# Patient Record
Sex: FEMALE | Race: WHITE | NOT HISPANIC OR LATINO | Employment: OTHER | URBAN - METROPOLITAN AREA
[De-identification: names, ages, dates, MRNs, and addresses within clinical notes are randomized per-mention and may not be internally consistent; named-entity substitution may affect disease eponyms.]

---

## 2023-06-12 LAB
ALANINE AMINOTRANSFERASE (SGPT) (U/L) IN SER/PLAS: 7 U/L (ref 7–45)
ALBUMIN (G/DL) IN SER/PLAS: 3.6 G/DL (ref 3.4–5)
ALKALINE PHOSPHATASE (U/L) IN SER/PLAS: 65 U/L (ref 33–136)
ANION GAP IN SER/PLAS: 10 MMOL/L (ref 10–20)
ASPARTATE AMINOTRANSFERASE (SGOT) (U/L) IN SER/PLAS: 17 U/L (ref 9–39)
BASOPHILS (10*3/UL) IN BLOOD BY AUTOMATED COUNT: 0.07 X10E9/L (ref 0–0.1)
BASOPHILS/100 LEUKOCYTES IN BLOOD BY AUTOMATED COUNT: 1 % (ref 0–2)
BILIRUBIN TOTAL (MG/DL) IN SER/PLAS: 0.5 MG/DL (ref 0–1.2)
CALCIUM (MG/DL) IN SER/PLAS: 9.1 MG/DL (ref 8.6–10.3)
CARBON DIOXIDE, TOTAL (MMOL/L) IN SER/PLAS: 31 MMOL/L (ref 21–32)
CHLORIDE (MMOL/L) IN SER/PLAS: 103 MMOL/L (ref 98–107)
CHOLESTEROL (MG/DL) IN SER/PLAS: 177 MG/DL (ref 0–199)
CHOLESTEROL IN HDL (MG/DL) IN SER/PLAS: 53.9 MG/DL
CHOLESTEROL/HDL RATIO: 3.3
CREATININE (MG/DL) IN SER/PLAS: 0.79 MG/DL (ref 0.5–1.05)
EOSINOPHILS (10*3/UL) IN BLOOD BY AUTOMATED COUNT: 0.13 X10E9/L (ref 0–0.4)
EOSINOPHILS/100 LEUKOCYTES IN BLOOD BY AUTOMATED COUNT: 1.8 % (ref 0–6)
ERYTHROCYTE DISTRIBUTION WIDTH (RATIO) BY AUTOMATED COUNT: 12.9 % (ref 11.5–14.5)
ERYTHROCYTE MEAN CORPUSCULAR HEMOGLOBIN CONCENTRATION (G/DL) BY AUTOMATED: 32.9 G/DL (ref 32–36)
ERYTHROCYTE MEAN CORPUSCULAR VOLUME (FL) BY AUTOMATED COUNT: 97 FL (ref 80–100)
ERYTHROCYTES (10*6/UL) IN BLOOD BY AUTOMATED COUNT: 4.32 X10E12/L (ref 4–5.2)
ESTIMATED AVERAGE GLUCOSE FOR HBA1C: 94 MG/DL
GFR FEMALE: 72 ML/MIN/1.73M2
GLUCOSE (MG/DL) IN SER/PLAS: 116 MG/DL (ref 74–99)
HEMATOCRIT (%) IN BLOOD BY AUTOMATED COUNT: 41.7 % (ref 36–46)
HEMOGLOBIN (G/DL) IN BLOOD: 13.7 G/DL (ref 12–16)
HEMOGLOBIN A1C/HEMOGLOBIN TOTAL IN BLOOD: 4.9 %
IMMATURE GRANULOCYTES/100 LEUKOCYTES IN BLOOD BY AUTOMATED COUNT: 0.3 % (ref 0–0.9)
LDL: 106 MG/DL (ref 0–99)
LEUKOCYTES (10*3/UL) IN BLOOD BY AUTOMATED COUNT: 7.3 X10E9/L (ref 4.4–11.3)
LYMPHOCYTES (10*3/UL) IN BLOOD BY AUTOMATED COUNT: 1.47 X10E9/L (ref 0.8–3)
LYMPHOCYTES/100 LEUKOCYTES IN BLOOD BY AUTOMATED COUNT: 20.1 % (ref 13–44)
MONOCYTES (10*3/UL) IN BLOOD BY AUTOMATED COUNT: 0.48 X10E9/L (ref 0.05–0.8)
MONOCYTES/100 LEUKOCYTES IN BLOOD BY AUTOMATED COUNT: 6.5 % (ref 2–10)
NEUTROPHILS (10*3/UL) IN BLOOD BY AUTOMATED COUNT: 5.16 X10E9/L (ref 1.6–5.5)
NEUTROPHILS/100 LEUKOCYTES IN BLOOD BY AUTOMATED COUNT: 70.3 % (ref 40–80)
PLATELETS (10*3/UL) IN BLOOD AUTOMATED COUNT: 298 X10E9/L (ref 150–450)
POTASSIUM (MMOL/L) IN SER/PLAS: 4.1 MMOL/L (ref 3.5–5.3)
PROTEIN TOTAL: 6.8 G/DL (ref 6.4–8.2)
SODIUM (MMOL/L) IN SER/PLAS: 140 MMOL/L (ref 136–145)
THYROTROPIN (MIU/L) IN SER/PLAS BY DETECTION LIMIT <= 0.05 MIU/L: 1.75 MIU/L (ref 0.44–3.98)
TRIGLYCERIDE (MG/DL) IN SER/PLAS: 84 MG/DL (ref 0–149)
UREA NITROGEN (MG/DL) IN SER/PLAS: 16 MG/DL (ref 6–23)
VLDL: 17 MG/DL (ref 0–40)

## 2023-10-21 PROBLEM — G47.00 INSOMNIA: Status: ACTIVE | Noted: 2023-10-21

## 2023-10-21 PROBLEM — R73.9 HYPERGLYCEMIA: Status: ACTIVE | Noted: 2023-10-21

## 2023-10-21 PROBLEM — F41.9 ANXIETY DISORDER: Status: ACTIVE | Noted: 2023-10-21

## 2023-10-21 PROBLEM — I10 HYPERTENSION: Status: ACTIVE | Noted: 2023-10-21

## 2023-10-21 PROBLEM — K57.32 DIVERTICULITIS, COLON: Status: ACTIVE | Noted: 2023-10-21

## 2023-10-21 PROBLEM — R63.4 WEIGHT LOSS: Status: ACTIVE | Noted: 2023-10-21

## 2023-10-21 PROBLEM — E87.6 HYPOKALEMIA: Status: ACTIVE | Noted: 2023-10-21

## 2023-10-21 PROBLEM — I48.21 PERMANENT ATRIAL FIBRILLATION (MULTI): Status: ACTIVE | Noted: 2023-10-21

## 2023-10-21 PROBLEM — D36.9 TUBULAR ADENOMA: Status: ACTIVE | Noted: 2023-10-21

## 2023-10-21 PROBLEM — I10 BENIGN ESSENTIAL HYPERTENSION: Status: ACTIVE | Noted: 2023-10-21

## 2023-10-21 RX ORDER — CARVEDILOL 3.12 MG/1
1 TABLET ORAL
COMMUNITY
Start: 2020-09-30

## 2023-10-21 RX ORDER — ALPRAZOLAM 0.25 MG/1
TABLET ORAL
COMMUNITY
End: 2023-10-24 | Stop reason: SINTOL

## 2023-10-21 RX ORDER — POTASSIUM CHLORIDE 20 MEQ/15ML
SOLUTION ORAL
COMMUNITY
Start: 2017-04-07 | End: 2023-10-31

## 2023-10-21 RX ORDER — TRAZODONE HYDROCHLORIDE 50 MG/1
1 TABLET ORAL NIGHTLY PRN
COMMUNITY
Start: 2021-12-09 | End: 2023-11-16

## 2023-10-21 RX ORDER — PANTOPRAZOLE SODIUM 40 MG/1
1 TABLET, DELAYED RELEASE ORAL DAILY
COMMUNITY
Start: 2022-05-09 | End: 2024-05-23 | Stop reason: SDUPTHER

## 2023-10-21 RX ORDER — ALPRAZOLAM 0.5 MG/1
0.25 TABLET ORAL DAILY PRN
COMMUNITY
End: 2023-10-24 | Stop reason: SINTOL

## 2023-10-21 RX ORDER — DILTIAZEM HYDROCHLORIDE 120 MG/1
1 TABLET, FILM COATED ORAL DAILY
COMMUNITY
Start: 2017-11-30 | End: 2024-02-27 | Stop reason: SDUPTHER

## 2023-10-21 RX ORDER — ALBUTEROL SULFATE 90 UG/1
2 AEROSOL, METERED RESPIRATORY (INHALATION) EVERY 4 HOURS PRN
COMMUNITY
Start: 2019-07-13 | End: 2024-01-12 | Stop reason: SDUPTHER

## 2023-10-24 PROBLEM — N39.41 URINARY INCONTINENCE, URGE: Status: ACTIVE | Noted: 2023-10-24

## 2023-10-24 PROBLEM — R39.15 URINARY URGENCY: Status: ACTIVE | Noted: 2023-10-24

## 2023-10-30 DIAGNOSIS — E87.6 HYPOKALEMIA: Primary | ICD-10-CM

## 2023-10-31 RX ORDER — POTASSIUM CHLORIDE 20 MEQ/15ML
SOLUTION ORAL
Qty: 946 ML | Refills: 0 | Status: SHIPPED | OUTPATIENT
Start: 2023-10-31 | End: 2024-05-17

## 2023-11-14 DIAGNOSIS — F51.01 PRIMARY INSOMNIA: Primary | ICD-10-CM

## 2023-11-16 ENCOUNTER — OFFICE VISIT (OUTPATIENT)
Dept: CARDIOLOGY | Facility: CLINIC | Age: 88
End: 2023-11-16
Payer: MEDICARE

## 2023-11-16 VITALS
OXYGEN SATURATION: 98 % | HEIGHT: 66 IN | BODY MASS INDEX: 25.71 KG/M2 | DIASTOLIC BLOOD PRESSURE: 80 MMHG | SYSTOLIC BLOOD PRESSURE: 110 MMHG | WEIGHT: 160 LBS | HEART RATE: 76 BPM

## 2023-11-16 DIAGNOSIS — I10 BENIGN ESSENTIAL HYPERTENSION: ICD-10-CM

## 2023-11-16 DIAGNOSIS — I48.21 PERMANENT ATRIAL FIBRILLATION (MULTI): Primary | ICD-10-CM

## 2023-11-16 PROCEDURE — 1159F MED LIST DOCD IN RCRD: CPT | Performed by: INTERNAL MEDICINE

## 2023-11-16 PROCEDURE — 3079F DIAST BP 80-89 MM HG: CPT | Performed by: INTERNAL MEDICINE

## 2023-11-16 PROCEDURE — 99214 OFFICE O/P EST MOD 30 MIN: CPT | Performed by: INTERNAL MEDICINE

## 2023-11-16 PROCEDURE — 1036F TOBACCO NON-USER: CPT | Performed by: INTERNAL MEDICINE

## 2023-11-16 PROCEDURE — 1160F RVW MEDS BY RX/DR IN RCRD: CPT | Performed by: INTERNAL MEDICINE

## 2023-11-16 PROCEDURE — 3074F SYST BP LT 130 MM HG: CPT | Performed by: INTERNAL MEDICINE

## 2023-11-16 RX ORDER — TRAZODONE HYDROCHLORIDE 50 MG/1
TABLET ORAL
Qty: 135 TABLET | Refills: 1 | Status: SHIPPED | OUTPATIENT
Start: 2023-11-16 | End: 2024-04-09 | Stop reason: WASHOUT

## 2024-01-12 DIAGNOSIS — R05.3 CHRONIC COUGH: Primary | ICD-10-CM

## 2024-01-12 RX ORDER — ALBUTEROL SULFATE 90 UG/1
2 AEROSOL, METERED RESPIRATORY (INHALATION) EVERY 4 HOURS PRN
Qty: 18 G | Refills: 1 | Status: SHIPPED | OUTPATIENT
Start: 2024-01-12 | End: 2024-03-18 | Stop reason: SDUPTHER

## 2024-01-12 NOTE — TELEPHONE ENCOUNTER
Next appointment changed to [facility]. Date and arrival time not changed. Called patient but no answer and no VM

## 2024-01-12 NOTE — TELEPHONE ENCOUNTER
PT requesting rx refill for albuterol inhaler to [facility]. Pt has pending appt 4/9/24 and LOV 8/29/23. Can this be done?

## 2024-01-22 DIAGNOSIS — I48.21 PERMANENT ATRIAL FIBRILLATION (MULTI): Primary | ICD-10-CM

## 2024-01-22 RX ORDER — RIVAROXABAN 15 MG/1
15 TABLET, FILM COATED ORAL DAILY
Qty: 30 TABLET | Refills: 2 | Status: SHIPPED | OUTPATIENT
Start: 2024-01-22 | End: 2024-04-23 | Stop reason: SDUPTHER

## 2024-02-27 DIAGNOSIS — I48.21 PERMANENT ATRIAL FIBRILLATION (MULTI): Primary | ICD-10-CM

## 2024-02-27 NOTE — TELEPHONE ENCOUNTER
Rx Refill Request Telephone Encounter    Name:  Henny Medina  :  [id number]  Medication Name:  dilTIAZem (Cardizem) 120 mg immediate release tablet         Specific Pharmacy location:    [id number] [address] Phone: [phone]   Fax: [phone]        Date of last appointment:  2023  Date of next appointment:  2024

## 2024-02-29 RX ORDER — DILTIAZEM HYDROCHLORIDE 120 MG/1
120 TABLET, FILM COATED ORAL DAILY
Qty: 90 TABLET | Refills: 0 | Status: SHIPPED | OUTPATIENT
Start: 2024-02-29 | End: 2024-03-01 | Stop reason: SDUPTHER

## 2024-03-01 DIAGNOSIS — I48.21 PERMANENT ATRIAL FIBRILLATION (MULTI): ICD-10-CM

## 2024-03-01 RX ORDER — DILTIAZEM HYDROCHLORIDE 120 MG/1
120 TABLET, FILM COATED ORAL DAILY
Qty: 90 TABLET | Refills: 0 | Status: SHIPPED | OUTPATIENT
Start: 2024-03-01 | End: 2024-05-24 | Stop reason: SDUPTHER

## 2024-03-18 ENCOUNTER — PATIENT MESSAGE (OUTPATIENT)
Dept: PRIMARY CARE | Facility: CLINIC | Age: 89
End: 2024-03-18
Payer: MEDICARE

## 2024-03-18 DIAGNOSIS — R05.3 CHRONIC COUGH: ICD-10-CM

## 2024-03-18 RX ORDER — ALBUTEROL SULFATE 90 UG/1
2 AEROSOL, METERED RESPIRATORY (INHALATION) EVERY 4 HOURS PRN
Qty: 18 G | Refills: 1 | Status: SHIPPED | OUTPATIENT
Start: 2024-03-18 | End: 2024-04-09 | Stop reason: SDUPTHER

## 2024-04-04 PROBLEM — E78.2 MIXED HYPERLIPIDEMIA: Status: ACTIVE | Noted: 2024-04-04

## 2024-04-04 PROBLEM — R53.83 FATIGUE: Status: ACTIVE | Noted: 2024-04-04

## 2024-04-04 PROBLEM — Z86.79 HISTORY OF HYPERTENSION: Status: ACTIVE | Noted: 2024-04-04

## 2024-04-04 PROBLEM — Z86.39 HISTORY OF ELEVATED LIPIDS: Status: ACTIVE | Noted: 2024-04-04

## 2024-04-04 PROBLEM — E78.5 HYPERLIPIDEMIA: Status: ACTIVE | Noted: 2024-04-04

## 2024-04-04 PROBLEM — Z13.89 SCREENING FOR MULTIPLE CONDITIONS: Status: ACTIVE | Noted: 2024-04-04

## 2024-04-04 PROBLEM — K21.9 GASTROESOPHAGEAL REFLUX DISEASE: Status: ACTIVE | Noted: 2024-04-04

## 2024-04-04 PROBLEM — E78.5 HYPERLIPIDEMIA: Status: RESOLVED | Noted: 2024-04-04 | Resolved: 2024-04-04

## 2024-04-04 PROBLEM — Z86.79 HISTORY OF HYPERTENSION: Status: RESOLVED | Noted: 2024-04-04 | Resolved: 2024-04-04

## 2024-04-04 PROBLEM — I25.10 CORONARY ATHEROSCLEROSIS: Status: ACTIVE | Noted: 2018-07-18

## 2024-04-04 PROBLEM — Z86.39 HISTORY OF ELEVATED LIPIDS: Status: RESOLVED | Noted: 2024-04-04 | Resolved: 2024-04-04

## 2024-04-04 PROBLEM — K63.1 PERFORATION OF INTESTINE (MULTI): Status: RESOLVED | Noted: 2024-04-04 | Resolved: 2024-04-04

## 2024-04-04 PROBLEM — Z00.00 HEALTHCARE MAINTENANCE: Status: ACTIVE | Noted: 2024-04-04

## 2024-04-09 ENCOUNTER — OFFICE VISIT (OUTPATIENT)
Dept: PRIMARY CARE | Facility: CLINIC | Age: 89
End: 2024-04-09
Payer: MEDICARE

## 2024-04-09 VITALS — HEART RATE: 79 BPM | TEMPERATURE: 97.2 F | DIASTOLIC BLOOD PRESSURE: 85 MMHG | SYSTOLIC BLOOD PRESSURE: 136 MMHG

## 2024-04-09 DIAGNOSIS — Z00.00 ROUTINE GENERAL MEDICAL EXAMINATION AT HEALTH CARE FACILITY: ICD-10-CM

## 2024-04-09 DIAGNOSIS — R05.3 CHRONIC COUGH: ICD-10-CM

## 2024-04-09 DIAGNOSIS — G47.00 INSOMNIA, UNSPECIFIED TYPE: ICD-10-CM

## 2024-04-09 DIAGNOSIS — I10 BENIGN ESSENTIAL HYPERTENSION: ICD-10-CM

## 2024-04-09 DIAGNOSIS — I48.21 PERMANENT ATRIAL FIBRILLATION (MULTI): ICD-10-CM

## 2024-04-09 DIAGNOSIS — Z00.00 HEALTHCARE MAINTENANCE: Primary | ICD-10-CM

## 2024-04-09 DIAGNOSIS — T14.8XXA ABRASION: ICD-10-CM

## 2024-04-09 DIAGNOSIS — Z13.89 SCREENING FOR MULTIPLE CONDITIONS: ICD-10-CM

## 2024-04-09 PROCEDURE — 3079F DIAST BP 80-89 MM HG: CPT | Performed by: FAMILY MEDICINE

## 2024-04-09 PROCEDURE — G0439 PPPS, SUBSEQ VISIT: HCPCS | Performed by: FAMILY MEDICINE

## 2024-04-09 PROCEDURE — 90677 PCV20 VACCINE IM: CPT | Performed by: FAMILY MEDICINE

## 2024-04-09 PROCEDURE — 1123F ACP DISCUSS/DSCN MKR DOCD: CPT | Performed by: FAMILY MEDICINE

## 2024-04-09 PROCEDURE — 99214 OFFICE O/P EST MOD 30 MIN: CPT | Performed by: FAMILY MEDICINE

## 2024-04-09 PROCEDURE — 1160F RVW MEDS BY RX/DR IN RCRD: CPT | Performed by: FAMILY MEDICINE

## 2024-04-09 PROCEDURE — 3075F SYST BP GE 130 - 139MM HG: CPT | Performed by: FAMILY MEDICINE

## 2024-04-09 PROCEDURE — 1170F FXNL STATUS ASSESSED: CPT | Performed by: FAMILY MEDICINE

## 2024-04-09 PROCEDURE — 1159F MED LIST DOCD IN RCRD: CPT | Performed by: FAMILY MEDICINE

## 2024-04-09 PROCEDURE — 1036F TOBACCO NON-USER: CPT | Performed by: FAMILY MEDICINE

## 2024-04-09 PROCEDURE — G0009 ADMIN PNEUMOCOCCAL VACCINE: HCPCS | Performed by: FAMILY MEDICINE

## 2024-04-09 RX ORDER — ALBUTEROL SULFATE 90 UG/1
2 AEROSOL, METERED RESPIRATORY (INHALATION) EVERY 4 HOURS PRN
Qty: 18 G | Refills: 3 | Status: SHIPPED | OUTPATIENT
Start: 2024-04-09 | End: 2024-10-06

## 2024-04-09 RX ORDER — TRAZODONE HYDROCHLORIDE 100 MG/1
100 TABLET ORAL NIGHTLY PRN
Qty: 90 TABLET | Refills: 1 | Status: SHIPPED | OUTPATIENT
Start: 2024-04-09 | End: 2025-04-09

## 2024-04-09 RX ORDER — FLUTICASONE PROPIONATE AND SALMETEROL XINAFOATE 45; 21 UG/1; UG/1
2 AEROSOL, METERED RESPIRATORY (INHALATION)
Qty: 36 G | Refills: 1 | Status: SHIPPED | OUTPATIENT
Start: 2024-04-09 | End: 2025-04-09

## 2024-04-09 ASSESSMENT — PATIENT HEALTH QUESTIONNAIRE - PHQ9
SUM OF ALL RESPONSES TO PHQ9 QUESTIONS 1 AND 2: 0
2. FEELING DOWN, DEPRESSED OR HOPELESS: NOT AT ALL
1. LITTLE INTEREST OR PLEASURE IN DOING THINGS: NOT AT ALL

## 2024-04-09 ASSESSMENT — ACTIVITIES OF DAILY LIVING (ADL)
MANAGING_FINANCES: NEEDS ASSISTANCE
GROCERY_SHOPPING: NEEDS ASSISTANCE
DRESSING: INDEPENDENT
DOING_HOUSEWORK: NEEDS ASSISTANCE
BATHING: INDEPENDENT
TAKING_MEDICATION: NEEDS ASSISTANCE

## 2024-04-09 NOTE — ASSESSMENT & PLAN NOTE
Continue with healthy diet and activity as tolerated.  Screening blood work ordered.  Prevnar 20 given today.  Recommend shingles vaccine.

## 2024-04-09 NOTE — ASSESSMENT & PLAN NOTE
Recommend using Vaseline and bandage.  Call for any increased redness pain or drainage or any other concerns.

## 2024-04-09 NOTE — PROGRESS NOTES
Subjective   Reason for Visit: Henny Medina is an 88 y.o. female here for a Medicare Wellness visit.  Patient presents today for annual wellness visit.  Her  is also present.  She reports that overall she has been doing okay.  She states that she does not feel like the albuterol works as well as it used to.  They are wondering if there is a stronger dose.  She also has been taking 2 trazodone for sleep and reports that this has been helping.  She would like to continue with this.  She did also have a fall.  She states that when she got up she slid and had an abrasion on her right elbow.  She did not hit her head.  She states that otherwise she is feeling well.  She denies any chest pain or shortness of breath or abdominal pain.  Reports mood is good.  Denies any other concerns.    Past Medical, Surgical, and Family History reviewed and updated in chart.    Reviewed all medications by prescribing practitioner or clinical pharmacist (such as prescriptions, OTCs, herbal therapies and supplements) and documented in the medical record.    HPI    Patient Care Team:  Vanessa Keys MD as PCP - General  Vanessa Keys MD as PCP - [employer] PCP     Review of Systems    Objective   Vitals:  /85 (BP Location: Left arm, Patient Position: Sitting)   Pulse 79   Temp 36.2 °C (97.2 °F)       Physical Exam  Vitals reviewed.   HENT:      Head: Normocephalic and atraumatic.      Right Ear: Tympanic membrane normal.      Left Ear: Tympanic membrane normal.      Nose: Nose normal.   Eyes:      Extraocular Movements: Extraocular movements intact.      Conjunctiva/sclera: Conjunctivae normal.      Pupils: Pupils are equal, round, and reactive to light.   Cardiovascular:      Rate and Rhythm: Normal rate. Rhythm irregularly irregular.      Pulses: Normal pulses.   Pulmonary:      Effort: Pulmonary effort is normal.      Breath sounds: Normal breath sounds.   Abdominal:      General: There is no distension.       Palpations: Abdomen is soft.      Tenderness: There is no abdominal tenderness.   Musculoskeletal:         General: Normal range of motion.      Cervical back: Normal range of motion and neck supple.      Right lower leg: No edema.      Left lower leg: No edema.   Lymphadenopathy:      Cervical: No cervical adenopathy.   Skin:     Comments: Patient with abrasion on the right lateral elbow.   Neurological:      General: No focal deficit present.      Mental Status: She is alert.         Assessment/Plan   Problem List Items Addressed This Visit       Benign essential hypertension    Current Assessment & Plan     Blood pressure controlled.  Continue current medications.         Relevant Orders    Lipid Panel    Comprehensive Metabolic Panel    CBC and Auto Differential    Albumin , Urine Random    Insomnia    Current Assessment & Plan     Patient reports she is sleeping well with the trazodone.  This was refilled.         Relevant Medications    traZODone (Desyrel) 100 mg tablet    Permanent atrial fibrillation (CMS/HCC)    Current Assessment & Plan     Persistent A-fib.  Rate controlled.  Continue with current medications including Xarelto.  Continue follow-up with cardiology.         Relevant Orders    Tsh With Reflex To Free T4 If Abnormal    Healthcare maintenance - Primary    Current Assessment & Plan     Continue with healthy diet and activity as tolerated.  Screening blood work ordered.  Prevnar 20 given today.  Recommend shingles vaccine.         Screening for multiple conditions    Current Assessment & Plan     Depression screening negative.         Chronic cough    Current Assessment & Plan     May have a component of COPD as she is a former smoker.  Complaining of albuterol not working as well for her breathing.  Will add Advair and continue with albuterol.  Follow-up if symptoms persist.         Relevant Medications    fluticasone propion-salmeteroL (Advair HFA) 45-21 mcg/actuation inhaler    albuterol  90 mcg/actuation inhaler    Abrasion    Current Assessment & Plan     Recommend using Vaseline and bandage.  Call for any increased redness pain or drainage or any other concerns.          Other Visit Diagnoses       Routine general medical examination at health care facility

## 2024-04-09 NOTE — ASSESSMENT & PLAN NOTE
Persistent A-fib.  Rate controlled.  Continue with current medications including Xarelto.  Continue follow-up with cardiology.

## 2024-04-09 NOTE — ASSESSMENT & PLAN NOTE
May have a component of COPD as she is a former smoker.  Complaining of albuterol not working as well for her breathing.  Will add Advair and continue with albuterol.  Follow-up if symptoms persist.

## 2024-04-23 DIAGNOSIS — I48.21 PERMANENT ATRIAL FIBRILLATION (MULTI): ICD-10-CM

## 2024-05-16 DIAGNOSIS — E87.6 HYPOKALEMIA: ICD-10-CM

## 2024-05-17 RX ORDER — POTASSIUM CHLORIDE 20 MEQ/15ML
SOLUTION ORAL
Qty: 946 ML | Refills: 0 | Status: SHIPPED | OUTPATIENT
Start: 2024-05-17

## 2024-05-23 DIAGNOSIS — K21.9 GASTROESOPHAGEAL REFLUX DISEASE, UNSPECIFIED WHETHER ESOPHAGITIS PRESENT: Primary | ICD-10-CM

## 2024-05-23 RX ORDER — PANTOPRAZOLE SODIUM 40 MG/1
40 TABLET, DELAYED RELEASE ORAL DAILY
Qty: 90 TABLET | Refills: 1 | Status: SHIPPED | OUTPATIENT
Start: 2024-05-23

## 2024-05-23 NOTE — TELEPHONE ENCOUNTER
Rx Refill Request Telephone Encounter    Name:  Henny FIELD Medina  :  [id number]  Medication Name:      pantoprazole (ProtoNix) 40 mg EC tablet       Specific Pharmacy location:    [id number] [address] Phone: [phone]   Fax: [phone]        Date of last appointment:  24    Date of next appointment:  10/22/24

## 2024-05-24 ENCOUNTER — TELEPHONE (OUTPATIENT)
Dept: CARDIOLOGY | Facility: CLINIC | Age: 89
End: 2024-05-24
Payer: MEDICARE

## 2024-05-24 DIAGNOSIS — I48.21 PERMANENT ATRIAL FIBRILLATION (MULTI): ICD-10-CM

## 2024-05-24 NOTE — TELEPHONE ENCOUNTER
Refill dilriazem 120 mg one pill daily #90 x3  to   [facility].      4 pills left **

## 2024-05-28 RX ORDER — DILTIAZEM HYDROCHLORIDE 120 MG/1
120 TABLET, FILM COATED ORAL DAILY
Qty: 90 TABLET | Refills: 3 | Status: SHIPPED | OUTPATIENT
Start: 2024-05-28 | End: 2025-05-28

## 2024-08-13 DIAGNOSIS — Z92.29 HX OF LONG TERM USE OF BLOOD THINNERS: ICD-10-CM

## 2024-08-13 DIAGNOSIS — I10 BENIGN ESSENTIAL HYPERTENSION: Primary | ICD-10-CM

## 2024-08-13 RX ORDER — CARVEDILOL 3.12 MG/1
3.12 TABLET ORAL
Qty: 180 TABLET | Refills: 3 | Status: SHIPPED | OUTPATIENT
Start: 2024-08-13 | End: 2025-08-13

## 2024-08-13 NOTE — TELEPHONE ENCOUNTER
Mr. Medina requesting refill for Henny for carvedilol 3.25 mg one twice daily at [id number] x3       Thanks much !

## 2024-09-16 ENCOUNTER — TELEPHONE (OUTPATIENT)
Dept: CARDIOLOGY | Facility: CLINIC | Age: 89
End: 2024-09-16
Payer: MEDICARE

## 2024-09-16 DIAGNOSIS — I48.21 PERMANENT ATRIAL FIBRILLATION (MULTI): ICD-10-CM

## 2024-09-16 NOTE — TELEPHONE ENCOUNTER
New Rx sent to Dr. Ellington to be filled at [facility] pharmacy. Asked pharmacy to attempt to find cost assistance for patient. Pt's  aware.

## 2024-09-24 DIAGNOSIS — I48.21 PERMANENT ATRIAL FIBRILLATION (MULTI): ICD-10-CM

## 2024-09-26 DIAGNOSIS — I48.21 PERMANENT ATRIAL FIBRILLATION (MULTI): ICD-10-CM

## 2024-10-01 ENCOUNTER — APPOINTMENT (OUTPATIENT)
Dept: CARDIOLOGY | Facility: CLINIC | Age: 89
End: 2024-10-01
Payer: MEDICARE

## 2024-10-01 VITALS
BODY MASS INDEX: 25.82 KG/M2 | HEIGHT: 66 IN | DIASTOLIC BLOOD PRESSURE: 60 MMHG | SYSTOLIC BLOOD PRESSURE: 135 MMHG | HEART RATE: 69 BPM

## 2024-10-01 DIAGNOSIS — I10 BENIGN ESSENTIAL HYPERTENSION: ICD-10-CM

## 2024-10-01 DIAGNOSIS — E78.2 MIXED HYPERLIPIDEMIA: ICD-10-CM

## 2024-10-01 DIAGNOSIS — I48.21 PERMANENT ATRIAL FIBRILLATION (MULTI): Primary | ICD-10-CM

## 2024-10-01 ASSESSMENT — PAIN SCALES - GENERAL: PAINLEVEL: 0-NO PAIN

## 2024-10-01 NOTE — PROGRESS NOTES
Chief Complaint:   No chief complaint on file.     History Of Present Illness:    Henny Medina is a 89 y.o. female with a history of permanent atrial fibrillation on Xarelto, hypertension, and colon mass s/p resection being evaluated for routine follow-up.     Still getting around with her wheelchair.  Denies any exertional chest pain or shortness of breath.  Blood pressure has been well-controlled.  Her  helps her with her blood pressure.     Echocardiogram 10/23/17 demonstrating normal LV size with lower normal function, EF 50-55%. Normal RV size and function. Mild left atrial enlargement. Moderate MR and TR.      Past Medical History:  She has a past medical history of Personal history of other diseases of the circulatory system, Personal history of other diseases of the circulatory system (12/14/2021), and Personal history of other endocrine, nutritional and metabolic disease.    Past Surgical History:  She has a past surgical history that includes Appendectomy (11/01/2017); Exploratory laparotomy (11/01/2017); Cataract extraction (11/01/2017); Colon surgery (11/01/2017); Appendectomy (11/01/2017); Colonoscopy (05/06/2022); and Other surgical history (12/14/2021).      Social History:  She reports that she has never smoked. She has never used smokeless tobacco. She reports that she does not drink alcohol and does not use drugs.    Family History:  Family History   Problem Relation Name Age of Onset    Heart attack Father          acute        Allergies:  Patient has no known allergies.    Outpatient Medications:  Current Outpatient Medications   Medication Instructions    albuterol 90 mcg/actuation inhaler 2 puffs, inhalation, Every 4 hours PRN    carvedilol (COREG) 3.125 mg, oral, 2 times daily (morning and late afternoon)    dilTIAZem (CARDIZEM) 120 mg, oral, Daily    fluticasone propion-salmeteroL (Advair HFA) 45-21 mcg/actuation inhaler 2 puffs, inhalation, 2 times daily RT, Rinse mouth with water  "after use to reduce aftertaste and incidence of candidiasis. Do not swallow.    pantoprazole (PROTONIX) 40 mg, oral, Daily    potassium chloride 20 mEq/15 mL liquid TAKE 7.5 ML BY MOUTH ONCE DAILY    rivaroxaban (XARELTO) 15 mg, oral, Daily    traZODone (DESYREL) 100 mg, oral, Nightly PRN       Last Recorded Vitals:  Visit Vitals  /60 (BP Location: Left arm, Patient Position: Sitting)   Pulse 69   Ht 1.676 m (5' 6\")   BMI 25.82 kg/m²   Smoking Status Never   BSA 1.84 m²      LASTWT(3):   Wt Readings from Last 3 Encounters:   11/16/23 72.6 kg (160 lb)       Physical Exam:  Virtual visit.       Last Labs:  CBC -  Recent Labs     06/12/23  1019 12/01/21  0654 11/30/21  0859   WBC 7.3 10.9 15.6*   HGB 13.7 11.7* 13.2   HCT 41.7 37.0 40.5    296 364   MCV 97 92 90       CMP -  Recent Labs     06/12/23  1019 12/01/21  0654 11/30/21  0859 11/02/21  2150 10/31/21  0842 12/03/20  0603 12/02/20  0611 12/01/20  0413    141 137   < > 140   < > 140 144   K 4.1 3.1* 3.5   < > 3.4*   < > 3.2* 3.7    109* 104   < > 105   < > 107 110*   CO2 31 27 25   < > 28   < > 25 23   ANIONGAP 10 8* 12   < > 10   < > 11 15   BUN 16 16 24*   < > 12   < > 14 18   CREATININE 0.79 0.59 0.73   < > 0.63   < > 0.55 0.52   MG  --   --   --   --  1.90  --  1.90 1.90    < > = values in this interval not displayed.     Recent Labs     06/12/23  1019 11/29/21  2211 11/02/21  2150   ALBUMIN 3.6 3.7 3.6   ALKPHOS 65 136 71   ALT 7 7 5*   AST 17 14 24   BILITOT 0.5 0.8 0.4       LIPID PANEL -   Recent Labs     06/12/23  1019 06/02/21  0856 08/07/19  0929   CHOL 177 186 200*   LDLF 106* 103* 122*   HDL 53.9 58.0 53.0   TRIG 84 125 127       Recent Labs     06/12/23  1019 06/02/21  0856 08/07/19  0929 07/10/19  1800   BNP  --   --   --  263*   HGBA1C 4.9 5.3 4.9  --            Assessment/Plan   1) permanent atrial fibrillation: Rates continue to be controlled by readings from her blood pressure machine.  No complaints of palpitations.  " Continue with long-term oral anticoagulation with Xarelto 15 mg daily.     2) hypertension: Blood pressure well controlled. No changes needed.     3) Elevated LDL: Given most recent guideline recommendations there is no role for statin therapy.     4) follow-up: 1 year or sooner if needed      Sam Ellington MD

## 2024-10-07 ENCOUNTER — PATIENT MESSAGE (OUTPATIENT)
Dept: PRIMARY CARE | Facility: CLINIC | Age: 89
End: 2024-10-07
Payer: MEDICARE

## 2024-10-07 DIAGNOSIS — G47.00 INSOMNIA, UNSPECIFIED TYPE: ICD-10-CM

## 2024-10-07 RX ORDER — TRAZODONE HYDROCHLORIDE 100 MG/1
100 TABLET ORAL NIGHTLY PRN
Qty: 90 TABLET | Refills: 1 | Status: SHIPPED | OUTPATIENT
Start: 2024-10-07 | End: 2025-10-07

## 2024-10-14 PROBLEM — Z13.89 SCREENING FOR MULTIPLE CONDITIONS: Status: RESOLVED | Noted: 2024-04-04 | Resolved: 2024-10-14

## 2024-10-22 ENCOUNTER — APPOINTMENT (OUTPATIENT)
Dept: PRIMARY CARE | Facility: CLINIC | Age: 89
End: 2024-10-22
Payer: MEDICARE

## 2024-10-22 VITALS — SYSTOLIC BLOOD PRESSURE: 131 MMHG | HEART RATE: 74 BPM | DIASTOLIC BLOOD PRESSURE: 86 MMHG | TEMPERATURE: 97.3 F

## 2024-10-22 DIAGNOSIS — R05.3 CHRONIC COUGH: ICD-10-CM

## 2024-10-22 DIAGNOSIS — G47.00 INSOMNIA, UNSPECIFIED TYPE: ICD-10-CM

## 2024-10-22 DIAGNOSIS — I48.21 PERMANENT ATRIAL FIBRILLATION (MULTI): ICD-10-CM

## 2024-10-22 DIAGNOSIS — E78.2 MIXED HYPERLIPIDEMIA: ICD-10-CM

## 2024-10-22 DIAGNOSIS — R73.9 HYPERGLYCEMIA: ICD-10-CM

## 2024-10-22 DIAGNOSIS — K21.9 GASTROESOPHAGEAL REFLUX DISEASE, UNSPECIFIED WHETHER ESOPHAGITIS PRESENT: ICD-10-CM

## 2024-10-22 DIAGNOSIS — R63.4 WEIGHT LOSS: ICD-10-CM

## 2024-10-22 DIAGNOSIS — I10 BENIGN ESSENTIAL HYPERTENSION: Primary | ICD-10-CM

## 2024-10-22 PROCEDURE — 1158F ADVNC CARE PLAN TLK DOCD: CPT | Performed by: FAMILY MEDICINE

## 2024-10-22 PROCEDURE — 90662 IIV NO PRSV INCREASED AG IM: CPT | Performed by: FAMILY MEDICINE

## 2024-10-22 PROCEDURE — 1123F ACP DISCUSS/DSCN MKR DOCD: CPT | Performed by: FAMILY MEDICINE

## 2024-10-22 PROCEDURE — 99214 OFFICE O/P EST MOD 30 MIN: CPT | Performed by: FAMILY MEDICINE

## 2024-10-22 PROCEDURE — G0008 ADMIN INFLUENZA VIRUS VAC: HCPCS | Performed by: FAMILY MEDICINE

## 2024-10-22 PROCEDURE — 1160F RVW MEDS BY RX/DR IN RCRD: CPT | Performed by: FAMILY MEDICINE

## 2024-10-22 PROCEDURE — 3075F SYST BP GE 130 - 139MM HG: CPT | Performed by: FAMILY MEDICINE

## 2024-10-22 PROCEDURE — 1036F TOBACCO NON-USER: CPT | Performed by: FAMILY MEDICINE

## 2024-10-22 PROCEDURE — 1159F MED LIST DOCD IN RCRD: CPT | Performed by: FAMILY MEDICINE

## 2024-10-22 PROCEDURE — G2211 COMPLEX E/M VISIT ADD ON: HCPCS | Performed by: FAMILY MEDICINE

## 2024-10-22 PROCEDURE — 3079F DIAST BP 80-89 MM HG: CPT | Performed by: FAMILY MEDICINE

## 2024-10-22 RX ORDER — TRAZODONE HYDROCHLORIDE 100 MG/1
100 TABLET ORAL NIGHTLY PRN
Qty: 90 TABLET | Refills: 1 | Status: SHIPPED | OUTPATIENT
Start: 2024-10-22 | End: 2025-10-22

## 2024-10-22 RX ORDER — PANTOPRAZOLE SODIUM 40 MG/1
40 TABLET, DELAYED RELEASE ORAL DAILY
Qty: 90 TABLET | Refills: 1 | Status: SHIPPED | OUTPATIENT
Start: 2024-10-22

## 2024-10-22 RX ORDER — FLUTICASONE PROPIONATE AND SALMETEROL XINAFOATE 45; 21 UG/1; UG/1
2 AEROSOL, METERED RESPIRATORY (INHALATION)
Qty: 36 G | Refills: 1 | Status: SHIPPED | OUTPATIENT
Start: 2024-10-22 | End: 2025-10-22

## 2024-10-22 ASSESSMENT — PATIENT HEALTH QUESTIONNAIRE - PHQ9
1. LITTLE INTEREST OR PLEASURE IN DOING THINGS: NOT AT ALL
SUM OF ALL RESPONSES TO PHQ9 QUESTIONS 1 AND 2: 0
2. FEELING DOWN, DEPRESSED OR HOPELESS: NOT AT ALL

## 2024-10-22 NOTE — ASSESSMENT & PLAN NOTE
Continue with current medications.  Patient on beta-blocker and calcium channel blocker from cardiology.  Continue with anticoagulation and follow-up with cardiology.

## 2024-10-22 NOTE — ASSESSMENT & PLAN NOTE
Blood pressure well-controlled.  Continue with current medications.  Follow-up in 6 months to reevaluate.

## 2024-10-22 NOTE — PROGRESS NOTES
Subjective   Patient ID: Henny Medina is a 89 y.o. female who presents for No chief complaint on file..  Patient presents today for follow-up on her chronic medical problems.  She reports that overall she has been doing well.  She states that she eats well.  States that she gets around her home with a walker without difficulty.  She denies any chest pain or palpitations.  No shortness of breath or abdominal pain.  She states that her breathing has been doing well with the Advair and she would like to continue with that.  She denies any other concerns.  HPI  Social History     Socioeconomic History    Marital status:      Spouse name: Not on file    Number of children: Not on file    Years of education: Not on file    Highest education level: Not on file   Occupational History    Not on file   Tobacco Use    Smoking status: Never    Smokeless tobacco: Never   Substance and Sexual Activity    Alcohol use: Never    Drug use: Never    Sexual activity: Not on file   Other Topics Concern    Not on file   Social History Narrative    Not on file     Social Drivers of Health     Financial Resource Strain: Not on file   Food Insecurity: Not on file   Transportation Needs: Not on file   Physical Activity: Not on file   Stress: Not on file   Social Connections: Not on file   Intimate Partner Violence: Not on file   Housing Stability: Not on file     Current Outpatient Medications   Medication Sig Dispense Refill    albuterol 90 mcg/actuation inhaler Inhale 2 puffs every 4 hours if needed for wheezing or shortness of breath. 18 g 3    carvedilol (Coreg) 3.125 mg tablet Take 1 tablet (3.125 mg) by mouth 2 times daily (morning and late afternoon). 180 tablet 3    dilTIAZem (Cardizem) 120 mg immediate release tablet Take 1 tablet (120 mg) by mouth once daily. 90 tablet 3    potassium chloride 20 mEq/15 mL liquid TAKE 7.5 ML BY MOUTH ONCE DAILY 946 mL 0    rivaroxaban (Xarelto) 15 mg tablet Take 1 tablet (15 mg) by mouth  once daily. 7 tablet 0    fluticasone propion-salmeteroL (Advair HFA) 45-21 mcg/actuation inhaler Inhale 2 puffs 2 times a day. Rinse mouth with water after use to reduce aftertaste and incidence of candidiasis. Do not swallow. 36 g 1    pantoprazole (ProtoNix) 40 mg EC tablet Take 1 tablet (40 mg) by mouth once daily. 90 tablet 1    traZODone (Desyrel) 100 mg tablet Take 1 tablet (100 mg) by mouth as needed at bedtime for sleep. 90 tablet 1     No current facility-administered medications for this visit.     Family History   Problem Relation Name Age of Onset    Heart attack Father          acute     Review of Systems  Immunization History   Administered Date(s) Administered    Flu vaccine, trivalent, preservative free, HIGH-DOSE, age 65y+ (Fluzone) 11/04/2015, 11/15/2016, 10/05/2017, 10/01/2018, 10/23/2018, 10/29/2019    Influenza, Seasonal, Quadrivalent, Adjuvanted 11/10/2020, 10/28/2021, 10/19/2022, 10/25/2023    Influenza, Unspecified 11/15/2016, 10/01/2017, 10/01/2021, 10/01/2022, 10/19/2022    Influenza, injectable, quadrivalent 11/10/2020    Influenza, seasonal, injectable 01/07/2010    Moderna COVID-19 vaccine, 12 years and older (50mcg/0.5mL)(Spikevax) 10/25/2023    Moderna COVID-19 vaccine, bivalent, blue cap/gray label *Check age/dose* 10/19/2022    Moderna SARS-CoV-2 Vaccination 01/29/2021, 02/26/2021, 12/21/2021, 05/24/2022    Novel influenza-H1N1-09, preservative-free 12/31/2009    PPD Test 05/18/2012    Pneumococcal conjugate vaccine, 13-valent (PREVNAR 13) 10/29/2019    Pneumococcal conjugate vaccine, 20-valent (PREVNAR 20) 04/09/2024    Pneumococcal polysaccharide vaccine, 23-valent, age 2 years and older (PNEUMOVAX 23) 10/10/2002, 06/15/2006, 09/10/2012, 02/18/2017    RESPIRATORY SYNCYTIAL VIRUS (RSV), ELIGIBLE PREGNANT PTS, 0.5 ML (ABRYSVO) 10/25/2023    Zoster, live 06/10/2010       Review of Systems negative except as noted in HPI and Chief complaint.     Objective                 /86 (BP  "Location: Right arm, Patient Position: Sitting)   Pulse 74   Temp 36.3 °C (97.3 °F)    Physical Exam  Vitals reviewed.   HENT:      Head: Normocephalic and atraumatic.      Right Ear: Tympanic membrane normal.      Left Ear: Tympanic membrane normal.      Nose: Nose normal.   Eyes:      Extraocular Movements: Extraocular movements intact.      Conjunctiva/sclera: Conjunctivae normal.      Pupils: Pupils are equal, round, and reactive to light.   Cardiovascular:      Rate and Rhythm: Normal rate and regular rhythm.      Pulses: Normal pulses.   Pulmonary:      Effort: Pulmonary effort is normal.      Breath sounds: Normal breath sounds.   Abdominal:      General: There is no distension.      Palpations: Abdomen is soft.      Tenderness: There is no abdominal tenderness.   Musculoskeletal:         General: Normal range of motion.      Cervical back: Normal range of motion and neck supple.      Right lower leg: No edema.      Left lower leg: No edema.   Lymphadenopathy:      Cervical: No cervical adenopathy.   Neurological:      General: No focal deficit present.      Mental Status: She is alert.       No results found for this or any previous visit (from the past 96 hours).  Lab Results   Component Value Date    WBC 7.3 06/12/2023    HGB 13.7 06/12/2023    HCT 41.7 06/12/2023    MCV 97 06/12/2023     06/12/2023     Lab Results   Component Value Date    GLUCOSE 116 (H) 06/12/2023    CALCIUM 9.1 06/12/2023     06/12/2023    K 4.1 06/12/2023    CO2 31 06/12/2023     06/12/2023    BUN 16 06/12/2023    CREATININE 0.79 06/12/2023     Lab Results   Component Value Date    CHOL 177 06/12/2023    CHOL 186 06/02/2021    CHOL 200 (H) 08/07/2019     Lab Results   Component Value Date    HDL 53.9 06/12/2023    HDL 58.0 06/02/2021    HDL 53.0 08/07/2019     No results found for: \"LDLCALC\"  Lab Results   Component Value Date    TRIG 84 06/12/2023    TRIG 125 06/02/2021    TRIG 127 08/07/2019     No components " "found for: \"CHOLHDL\"   Lab Results   Component Value Date    TSH 1.75 06/12/2023      Lab Results   Component Value Date    HGBA1C 4.9 06/12/2023      No results found for: \"ALBUMINUR\"   Assessment/Plan   Problem List Items Addressed This Visit       Benign essential hypertension - Primary     Blood pressure well-controlled.  Continue with current medications.  Follow-up in 6 months to reevaluate.         Hyperglycemia     Blood sugar has been elevated in the past.  Plan to check CMP and hemoglobin A1c.         Insomnia     Patient reports she is sleeping well with trazodone.  This was refilled.         Relevant Medications    traZODone (Desyrel) 100 mg tablet    Permanent atrial fibrillation (Multi)     Continue with current medications.  Patient on beta-blocker and calcium channel blocker from cardiology.  Continue with anticoagulation and follow-up with cardiology.         Weight loss     Patient was unable to get on the scale today for weight.  Is thin appearing.  Recommend proceeding with blood work.  Declines any other workup.         Gastroesophageal reflux disease     Continue with Protonix.         Relevant Medications    pantoprazole (ProtoNix) 40 mg EC tablet    Mixed hyperlipidemia     Plan to recheck blood work.  CMP and lipids previously ordered.         Chronic cough     Symptoms improved with Advair.  This was refilled.         Relevant Medications    fluticasone propion-salmeteroL (Advair HFA) 45-21 mcg/actuation inhaler            "

## 2024-10-22 NOTE — ASSESSMENT & PLAN NOTE
Patient was unable to get on the scale today for weight.  Is thin appearing.  Recommend proceeding with blood work.  Declines any other workup.

## 2024-11-11 DIAGNOSIS — R05.3 CHRONIC COUGH: ICD-10-CM

## 2024-11-14 RX ORDER — ALBUTEROL SULFATE 90 UG/1
2 INHALANT RESPIRATORY (INHALATION) EVERY 4 HOURS PRN
Qty: 18 G | Refills: 2 | Status: SHIPPED | OUTPATIENT
Start: 2024-11-14

## 2024-11-29 DIAGNOSIS — E87.6 HYPOKALEMIA: ICD-10-CM

## 2024-11-29 RX ORDER — POTASSIUM CHLORIDE 20 MEQ/15ML
10 SOLUTION ORAL ONCE
Qty: 946 ML | Refills: 0 | Status: SHIPPED | OUTPATIENT
Start: 2024-11-29 | End: 2024-11-29 | Stop reason: SDUPTHER

## 2024-11-29 RX ORDER — POTASSIUM CHLORIDE 20 MEQ/15ML
40 SOLUTION ORAL ONCE
Qty: 946 ML | Refills: 0 | Status: SHIPPED | OUTPATIENT
Start: 2024-11-29 | End: 2024-11-29 | Stop reason: WASHOUT

## 2024-11-29 RX ORDER — POTASSIUM CHLORIDE 20 MEQ/15ML
10 SOLUTION ORAL DAILY
Qty: 946 ML | Refills: 0 | Status: SHIPPED | OUTPATIENT
Start: 2024-11-29

## 2024-11-29 RX ORDER — POTASSIUM CHLORIDE 20 MEQ/15ML
SOLUTION ORAL
Qty: 946 ML | Refills: 0 | Status: CANCELLED | OUTPATIENT
Start: 2024-11-29

## 2024-12-20 DIAGNOSIS — I48.21 PERMANENT ATRIAL FIBRILLATION (MULTI): ICD-10-CM

## 2025-01-27 ENCOUNTER — TELEPHONE (OUTPATIENT)
Dept: CARDIOLOGY | Facility: CLINIC | Age: OVER 89
End: 2025-01-27
Payer: MEDICARE

## 2025-01-27 NOTE — PROGRESS NOTES
Chief Complaint:   No chief complaint on file.     History Of Present Illness:    Henny Medina is a 88 y.o. female with a history of permanent atrial fibrillation on Xarelto, hypertension, and colon mass s/p resection being evaluated for routine follow-up.     Continues to get around in the wheelchair.  Denies any chest pain, palpitations, or shortness of breath.     Echocardiogram 10/23/17 demonstrating normal LV size with lower normal function, EF 50-55%. Normal RV size and function. Mild left atrial enlargement. Moderate MR and TR.      Past Medical History:  She has a past medical history of Personal history of other diseases of the circulatory system, Personal history of other diseases of the circulatory system (12/14/2021), and Personal history of other endocrine, nutritional and metabolic disease.    Past Surgical History:  She has a past surgical history that includes Appendectomy (11/01/2017); Exploratory laparotomy (11/01/2017); Cataract extraction (11/01/2017); Colon surgery (11/01/2017); Appendectomy (11/01/2017); Colonoscopy (05/06/2022); and Other surgical history (12/14/2021).      Social History:  She reports that she has never smoked. She has never used smokeless tobacco. She reports that she does not drink alcohol and does not use drugs.    Family History:  Family History   Problem Relation Name Age of Onset    Heart attack Father          acute        Allergies:  Patient has no known allergies.    Outpatient Medications:  Current Outpatient Medications   Medication Instructions    albuterol 90 mcg/actuation inhaler 2 puffs, inhalation, Every 4 hours PRN    carvedilol (Coreg) 3.125 mg tablet 1 tablet, oral, 2 times daily with meals    dilTIAZem (Cardizem) 120 mg immediate release tablet 1 tablet, oral, Daily    pantoprazole (ProtoNix) 40 mg EC tablet 1 tablet, oral, Daily    potassium chloride 20 mEq/15 mL liquid TAKE 7.5ML BY MOUTH ONCE DAILY.    rivaroxaban (Xarelto) 15 mg tablet 1 tablet,  "oral, Daily    traZODone (Desyrel) 50 mg tablet 1 tablet, oral, Nightly PRN       Last Recorded Vitals:  Visit Vitals  /80 (BP Location: Right arm, Patient Position: Sitting)   Pulse 76   Ht 1.676 m (5' 6\")   Wt 72.6 kg (160 lb)   SpO2 98%   BMI 25.82 kg/m²   Smoking Status Never   BSA 1.84 m²      LASTWT(3):   Wt Readings from Last 3 Encounters:   11/16/23 72.6 kg (160 lb)       Physical Exam:  HEENT: Carotid upstrokes normal with no bruits. JVP is normal.  Pulmonary: Clear to auscultation bilaterally.  Cardiovascular: S1,S2, irregularly irregular. No appreciable murmurs, rubs or gallops.   Lower extremities: Warm. 2+ distal pulses. No edema.       Last Labs:  CBC -  Recent Labs     06/12/23  1019 12/01/21  0654 11/30/21  0859   WBC 7.3 10.9 15.6*   HGB 13.7 11.7* 13.2   HCT 41.7 37.0 40.5    296 364   MCV 97 92 90       CMP -  Recent Labs     06/12/23  1019 12/01/21  0654 11/30/21  0859 11/02/21  2150 10/31/21  0842 12/03/20  0603 12/02/20  0611 12/01/20  0413    141 137   < > 140   < > 140 144   K 4.1 3.1* 3.5   < > 3.4*   < > 3.2* 3.7    109* 104   < > 105   < > 107 110*   CO2 31 27 25   < > 28   < > 25 23   ANIONGAP 10 8* 12   < > 10   < > 11 15   BUN 16 16 24*   < > 12   < > 14 18   CREATININE 0.79 0.59 0.73   < > 0.63   < > 0.55 0.52   MG  --   --   --   --  1.90  --  1.90 1.90    < > = values in this interval not displayed.     Recent Labs     06/12/23  1019 11/29/21  2211 11/02/21  2150   ALBUMIN 3.6 3.7 3.6   ALKPHOS 65 136 71   ALT 7 7 5*   AST 17 14 24   BILITOT 0.5 0.8 0.4       LIPID PANEL -   Recent Labs     06/12/23  1019 06/02/21  0856 08/07/19  0929   CHOL 177 186 200*   LDLF 106* 103* 122*   HDL 53.9 58.0 53.0   TRIG 84 125 127       Recent Labs     06/12/23  1019 06/02/21  0856 08/07/19  0929 07/10/19  1800   BNP  --   --   --  263*   HGBA1C 4.9 5.3 4.9  --            Assessment/Plan   1) permanent atrial fibrillation: Rates appear to be controlled.  Remains asymptomatic. "  Continue with rate control and anticoagulation.     2) hypertension: Blood pressure well controlled. No changes needed.     3) Elevated LDL: As described in earlier notes current recommendations would be to continue with observation and no medical therapy.     4) follow-up: 1 year or sooner if needed      Sam Ellington MD   no

## 2025-01-27 NOTE — TELEPHONE ENCOUNTER
Mr. Medina tells me that the Xarelto will be over $100.00 per month.  He made some calls and someone suggested Brilinta.  I told him, I didn't believe it was the same medicine but would check with you.   Am I correct?

## 2025-01-28 DIAGNOSIS — I48.21 PERMANENT ATRIAL FIBRILLATION (MULTI): Primary | ICD-10-CM

## 2025-02-18 ENCOUNTER — APPOINTMENT (OUTPATIENT)
Dept: PHARMACY | Facility: HOSPITAL | Age: OVER 89
End: 2025-02-18
Payer: MEDICARE

## 2025-02-18 DIAGNOSIS — I48.21 PERMANENT ATRIAL FIBRILLATION (MULTI): ICD-10-CM

## 2025-02-18 NOTE — PROGRESS NOTES
Pharmacist Clinic: Cardiology Management    Henny Medina is a 89 y.o. female was referred to Clinical Pharmacy Team for Anticoagulation management.     Referring Provider: Sam Ellington MD    THIS IS A NEW PATIENT APPOINTMENT. PATIENT WILL BE ESTABLISHING CARE WITH CLINICAL PHARMACY.    Appointment was completed by Arnav who was reached at primary number.    Allergies Reviewed? Yes    No Known Allergies    Past Medical History:   Diagnosis Date    Personal history of other diseases of the circulatory system     History of atrial fibrillation    Personal history of other diseases of the circulatory system 12/14/2021    History of hypertension    Personal history of other endocrine, nutritional and metabolic disease     History of hyperlipidemia       Current Outpatient Medications on File Prior to Visit   Medication Sig Dispense Refill    albuterol 90 mcg/actuation inhaler INHALE TWO PUFFS BY MOUTH EVERY 4 HOURS AS NEEDED FOR WHEEZING OR SHORTNESS OF BREATH 18 g 2    carvedilol (Coreg) 3.125 mg tablet Take 1 tablet (3.125 mg) by mouth 2 times daily (morning and late afternoon). 180 tablet 3    dilTIAZem (Cardizem) 120 mg immediate release tablet Take 1 tablet (120 mg) by mouth once daily. 90 tablet 3    fluticasone propion-salmeteroL (Advair HFA) 45-21 mcg/actuation inhaler Inhale 2 puffs 2 times a day. Rinse mouth with water after use to reduce aftertaste and incidence of candidiasis. Do not swallow. 36 g 1    pantoprazole (ProtoNix) 40 mg EC tablet Take 1 tablet (40 mg) by mouth once daily. 90 tablet 1    potassium chloride 20 mEq/15 mL liquid Take 7.5 mL (10 mEq) by mouth once daily. 946 mL 0    rivaroxaban (Xarelto) 15 mg tablet Take 1 tablet (15 mg) by mouth once daily. 30 tablet 11    traZODone (Desyrel) 100 mg tablet Take 1 tablet (100 mg) by mouth as needed at bedtime for sleep. 90 tablet 1     No current facility-administered medications on file prior to visit.         RELEVANT LAB RESULTS:  Lab  "Results   Component Value Date    BILITOT 0.5 06/12/2023    CALCIUM 9.1 06/12/2023    CO2 31 06/12/2023     06/12/2023    CREATININE 0.79 06/12/2023    GLUCOSE 116 (H) 06/12/2023    ALKPHOS 65 06/12/2023    K 4.1 06/12/2023    PROT 6.8 06/12/2023     06/12/2023    AST 17 06/12/2023    ALT 7 06/12/2023    BUN 16 06/12/2023    ANIONGAP 10 06/12/2023    MG 1.90 10/31/2021    PHOS 2.5 12/02/2020    ALBUMIN 3.6 06/12/2023    LIPASE 13 11/29/2021    GFRF 72 06/12/2023     Lab Results   Component Value Date    TRIG 84 06/12/2023    CHOL 177 06/12/2023    HDL 53.9 06/12/2023     No results found for: \"BMCBC\", \"CBCDIF\"     PHARMACEUTICAL ASSESSMENT:    MEDICATION RECONCILIATION    Home Pharmacy Reviewed? Yes, describe: [facility]    No changes were made to home medication list at today's visit.    Drug Interactions? No    Medication Documentation Review Audit       Reviewed by Vanessa Keys MD (Physician) on 10/22/24 at 1214      Medication Order Taking? Sig Documenting Provider Last Dose Status   albuterol 90 mcg/actuation inhaler [id number] Yes Inhale 2 puffs every 4 hours if needed for wheezing or shortness of breath. Vanessa Keys MD  Active   carvedilol (Coreg) 3.125 mg tablet [id number] Yes Take 1 tablet (3.125 mg) by mouth 2 times daily (morning and late afternoon). Sam Ellington MD  Active   dilTIAZem (Cardizem) 120 mg immediate release tablet [id number] Yes Take 1 tablet (120 mg) by mouth once daily. Sam Ellington MD  Active   fluticasone propion-salmeteroL (Advair HFA) 45-21 mcg/actuation inhaler [id number] Yes Inhale 2 puffs 2 times a day. Rinse mouth with water after use to reduce aftertaste and incidence of candidiasis. Do not swallow. Vanessa Keys MD  Active   pantoprazole (ProtoNix) 40 mg EC tablet [id number] Yes Take 1 tablet (40 mg) by mouth once daily. Vanessa Keys MD  Active   potassium chloride 20 mEq/15 mL liquid [id number] Yes TAKE 7.5 ML BY MOUTH ONCE DAILY Vanessa Keys MD  Active "   rivaroxaban (Xarelto) 15 mg tablet [id number] Yes Take 1 tablet (15 mg) by mouth once daily. Sam Ellington MD  Active   traZODone (Desyrel) 100 mg tablet [id number] Yes Take 1 tablet (100 mg) by mouth as needed at bedtime for sleep. Vanessa Keys MD  Active                    DISEASE MANAGEMENT ASSESSMENT:     ANTICOAGULATION ASSESSMENT    The ASCVD Risk score (Evelia DK, et al., 2019) failed to calculate for the following reasons:    The 2019 ASCVD risk score is only valid for ages 40 to 79    DIAGNOSIS: prevention of nonvalvular atrial fibrilliation stroke and systemic embolism  - Patient is projected to be on anticoagulation indefinite  - [id number] Score: [4] (only included if diagnosis is atrial fibrillation)   Age: [<65 (0)] [65-74 (+1)] [> 75 (+2)]: 2  Sex: [Male/Female (+1)]: 1  CHF history: [No/Yes(+1)]: 0  Hypertension history: [No/Yes(+1)]: 1  Stroke/TIA/thromboembolism history: [No/Yes(+2)]: 0  Vascular disease history (prior MI, peripheral artery disease, aortic plaque): [No/Yes(+1)]: 0  Diabetes history: [No/Yes(+1)]: 0    CURRENT PHARMACOTHERAPY:   Xarelto 15mg daily  CrCl 55ml/min  Maintained on lower dose due to hospitalization for GI bleed.    Affordability/Accessibility: unable to afford high copay for name brand medications  Adherence/Organization: reports adherence  Adverse Reactions: none reported  Recent Hospitalizations: none reported  Recent Falls/Trauma: none reported  Changes in Tobacco or Alcohol Intake:   Tobacco: does not use  Alcohol: does not use    EDUCATION/COUNSELING:   - Counseled patient on MOA, expectations, duration of therapy, contraindications, administration, and monitoring parameters  - Counseled patient of side effects that are indicative of bleeding such as dark tarry stool, unexplainable bruising, or vomiting up a coffee ground like substance      DISCUSSION/NOTES:   Arnav Inman has a month supply of Xarelto at home.  No bruising, bleeds, or injuries  reported.  Income under limit for  PAP will start application at this time.    ASSESSMENT:    Assessment/Plan   Problem List Items Addressed This Visit       Permanent atrial fibrillation (Multi)     No dose adjustments needed to Xarelto at today's visit.  On 15mg daily due to previous GI bleed while on anticoagulation.         Relevant Orders    Referral to Clinical Pharmacy      Patient Assistance Program (PAP)    Application for program to be submitted for the following medications: Xarelto    [facility] Address: [facility]   Prescription Insurance:   Yes   Members of Household: 2   Files Taxes: No       Patient will be faxing financial information to pharmacist directly at [phone].    Patient verbally reports monthly or yearly income which is less than 400% federal poverty level    Patient aware this process may take up to 6 weeks.     If approved medication must be filled through [facility] and MEDICATION WILL BE MAILED TO PATIENT.         RECOMMENDATIONS/PLAN:    CONTINUE  Xarelto 15mg daily    Last Appnt with Referring Provider: 10/1/24  Next Appnt with Referring Provider: 10/7/25  Clinical Pharmacist follow up: 3/17/25  Type of Encounter: Kenya Neves PharmD    Verbal consent to manage patient's drug therapy was obtained from an individual authorized to act on behalf of a patient. They were informed they may decline to participate or withdraw from participation in pharmacy services at any time.    Continue all meds under the continuation of care with the referring provider and clinical pharmacy team.

## 2025-02-18 NOTE — ASSESSMENT & PLAN NOTE
No dose adjustments needed to Xarelto at today's visit.  On 15mg daily due to previous GI bleed while on anticoagulation.

## 2025-03-11 DIAGNOSIS — I48.21 PERMANENT ATRIAL FIBRILLATION (MULTI): Primary | ICD-10-CM

## 2025-03-17 ENCOUNTER — APPOINTMENT (OUTPATIENT)
Dept: PHARMACY | Facility: HOSPITAL | Age: OVER 89
End: 2025-03-17
Payer: MEDICARE

## 2025-03-17 DIAGNOSIS — I48.21 PERMANENT ATRIAL FIBRILLATION (MULTI): ICD-10-CM

## 2025-03-17 NOTE — ASSESSMENT & PLAN NOTE
No dose adjustments needed to Xarelto at today's visit.  Borderline kidney function but will continue on lower dose due to history of GI bleed.

## 2025-03-17 NOTE — PROGRESS NOTES
Pharmacist Clinic: Cardiology Management    Henny Medina is a 89 y.o. female was referred to Clinical Pharmacy Team for Anticoagulation management.     Referring Provider: Sam Ellington MD    THIS IS A FOLLOW UP PATIENT APPOINTMENT. AT LAST VISIT ON 2/18/25 WITH PHARMACIST (Josh Neves).    Appointment was completed by Arnav who was reached at primary number.    REVIEW OF PAST APPNT (IF APPLICABLE):   During last appointment Henny was screened for [facility] PAP to cover the cost of Xarelto.  Since last appointment her application was submitted. The clinical pharmacy team is waiting on her application to be approved.    No Known Allergies    Past Medical History:   Diagnosis Date    Personal history of other diseases of the circulatory system     History of atrial fibrillation    Personal history of other diseases of the circulatory system 12/14/2021    History of hypertension    Personal history of other endocrine, nutritional and metabolic disease     History of hyperlipidemia       Current Outpatient Medications on File Prior to Visit   Medication Sig Dispense Refill    albuterol 90 mcg/actuation inhaler INHALE TWO PUFFS BY MOUTH EVERY 4 HOURS AS NEEDED FOR WHEEZING OR SHORTNESS OF BREATH 18 g 2    carvedilol (Coreg) 3.125 mg tablet Take 1 tablet (3.125 mg) by mouth 2 times daily (morning and late afternoon). 180 tablet 3    dilTIAZem (Cardizem) 120 mg immediate release tablet Take 1 tablet (120 mg) by mouth once daily. 90 tablet 3    fluticasone propion-salmeteroL (Advair HFA) 45-21 mcg/actuation inhaler Inhale 2 puffs 2 times a day. Rinse mouth with water after use to reduce aftertaste and incidence of candidiasis. Do not swallow. 36 g 1    pantoprazole (ProtoNix) 40 mg EC tablet Take 1 tablet (40 mg) by mouth once daily. 90 tablet 1    potassium chloride 20 mEq/15 mL liquid Take 7.5 mL (10 mEq) by mouth once daily. 946 mL 0    rivaroxaban (Xarelto) 15 mg tablet Take 1 tablet (15 mg) by mouth once daily  "in the evening. Take with meals. 90 tablet 3    traZODone (Desyrel) 100 mg tablet Take 1 tablet (100 mg) by mouth as needed at bedtime for sleep. 90 tablet 1    [DISCONTINUED] rivaroxaban (Xarelto) 15 mg tablet Take 1 tablet (15 mg) by mouth once daily. 30 tablet 11     No current facility-administered medications on file prior to visit.         RELEVANT LAB RESULTS:  Lab Results   Component Value Date    BILITOT 0.5 06/12/2023    CALCIUM 9.1 06/12/2023    CO2 31 06/12/2023     06/12/2023    CREATININE 0.79 06/12/2023    GLUCOSE 116 (H) 06/12/2023    ALKPHOS 65 06/12/2023    K 4.1 06/12/2023    PROT 6.8 06/12/2023     06/12/2023    AST 17 06/12/2023    ALT 7 06/12/2023    BUN 16 06/12/2023    ANIONGAP 10 06/12/2023    MG 1.90 10/31/2021    PHOS 2.5 12/02/2020    ALBUMIN 3.6 06/12/2023    LIPASE 13 11/29/2021    GFRF 72 06/12/2023     Lab Results   Component Value Date    TRIG 84 06/12/2023    CHOL 177 06/12/2023    HDL 53.9 06/12/2023     No results found for: \"BMCBC\", \"CBCDIF\"     PHARMACEUTICAL ASSESSMENT:    MEDICATION RECONCILIATION    Was a medication reconciliation completed at this visit? No    Drug Interactions? No    Medication Documentation Review Audit       Reviewed by Vanessa Keys MD (Physician) on 10/22/24 at 1214      Medication Order Taking? Sig Documenting Provider Last Dose Status   albuterol 90 mcg/actuation inhaler [id number] Yes Inhale 2 puffs every 4 hours if needed for wheezing or shortness of breath. Vanessa Keys MD  Active   carvedilol (Coreg) 3.125 mg tablet [id number] Yes Take 1 tablet (3.125 mg) by mouth 2 times daily (morning and late afternoon). Sam Ellington MD  Active   dilTIAZem (Cardizem) 120 mg immediate release tablet [id number] Yes Take 1 tablet (120 mg) by mouth once daily. Sam Ellington MD  Active   fluticasone propion-salmeteroL (Advair HFA) 45-21 mcg/actuation inhaler [id number] Yes Inhale 2 puffs 2 times a day. Rinse mouth with water after use to reduce " aftertaste and incidence of candidiasis. Do not swallow. Vanessa Keys MD  Active   pantoprazole (ProtoNix) 40 mg EC tablet [id number] Yes Take 1 tablet (40 mg) by mouth once daily. Vanessa Keys MD  Active   potassium chloride 20 mEq/15 mL liquid [id number] Yes TAKE 7.5 ML BY MOUTH ONCE DAILY Vanessa Keys MD  Active   rivaroxaban (Xarelto) 15 mg tablet [id number] Yes Take 1 tablet (15 mg) by mouth once daily. Sam Ellington MD  Active   traZODone (Desyrel) 100 mg tablet [id number] Yes Take 1 tablet (100 mg) by mouth as needed at bedtime for sleep. Vanessa Keys MD  Active                    DISEASE MANAGEMENT ASSESSMENT:     ANTICOAGULATION ASSESSMENT    The ASCVD Risk score (Evelia SANTANA, et al., 2019) failed to calculate for the following reasons:    The 2019 ASCVD risk score is only valid for ages 40 to 79    DIAGNOSIS: prevention of nonvalvular atrial fibrilliation stroke and systemic embolism  - Patient is projected to be on anticoagulation indefinite  - [id number] Score: [4] (only included if diagnosis is atrial fibrillation)   Age: [<65 (0)] [65-74 (+1)] [> 75 (+2)]: 2  Sex: [Male/Female (+1)]: 1  CHF history: [No/Yes(+1)]: 0  Hypertension history: [No/Yes(+1)]: 1  Stroke/TIA/thromboembolism history: [No/Yes(+2)]: 0  Vascular disease history (prior MI, peripheral artery disease, aortic plaque): [No/Yes(+1)]: 0  Diabetes history: [No/Yes(+1)]: 0     CURRENT PHARMACOTHERAPY:   Xarelto 15mg daily  CrCl 55ml/min  Maintained on lower dose due to hospitalization for GI bleed.    Affordability/Accessibility: pending [facility] PAP  Adherence/Organization: reports adherence  Adverse Reactions: none reported  Recent Hospitalizations: none reported  Recent Falls/Trauma: none reported  Changes in Tobacco or Alcohol Intake:   Tobacco: does not use  Alcohol: does not use    EDUCATION/COUNSELING:   - Counseled patient on MOA, expectations, duration of therapy, contraindications, administration, and monitoring parameters  -  Counseled patient of side effects that are indicative of bleeding such as dark tarry stool, unexplainable bruising, or vomiting up a coffee ground like substance       DISCUSSION/NOTES:   Patient is adherent with Xarelto. No bruising or bleeding noted.  Pending approval for  PAP. Application was submitted we are waiting on billing through prescription insurance to go through prior to approval. This will occur on 3/20/25.    ASSESSMENT:    Assessment/Plan   Problem List Items Addressed This Visit       Permanent atrial fibrillation (Multi)     No dose adjustments needed to Xarelto at today's visit.  Borderline kidney function but will continue on lower dose due to history of GI bleed.         Relevant Orders    Referral to Clinical Pharmacy         RECOMMENDATIONS/PLAN:    CONTINUE  Xarelto 15mg daily    Last Appnt with Referring Provider: 10/1/24  Next Appnt with Referring Provider: 10/7/25  Clinical Pharmacist follow up: 3/17/25  Type of Encounter: Kenya Neves PharmD    Verbal consent to manage patient's drug therapy was obtained from the patient . They were informed they may decline to participate or withdraw from participation in pharmacy services at any time.    Continue all meds under the continuation of care with the referring provider and clinical pharmacy team.

## 2025-03-18 DIAGNOSIS — R05.3 CHRONIC COUGH: ICD-10-CM

## 2025-03-19 RX ORDER — ALBUTEROL SULFATE 90 UG/1
2 INHALANT RESPIRATORY (INHALATION) EVERY 4 HOURS PRN
Qty: 18 G | Refills: 2 | Status: SHIPPED | OUTPATIENT
Start: 2025-03-19

## 2025-03-20 ENCOUNTER — TELEPHONE (OUTPATIENT)
Dept: PHARMACY | Facility: HOSPITAL | Age: OVER 89
End: 2025-03-20
Payer: MEDICARE

## 2025-03-20 PROCEDURE — RXMED WILLOW AMBULATORY MEDICATION CHARGE

## 2025-03-20 NOTE — TELEPHONE ENCOUNTER
Patient Assistance Program Approval:     We are pleased to inform you that your application for assistance has been approved.     This approval is valid through  3/20/26  as long as the following criteria continue to be satisfied:     Your medication (Xarelto) remains covered under your current insurance plan.   Your prescriber does not discontinue therapy.   You do not seek reimbursement from any other private or government-funded programs for the  medication.    Under this program, the pharmacy will first bill your insurance plan for your indemnified specified medication. The [employer] Assistance Fund will then offset your copay balance, so that your out-of pocket expense for your specialty medication will be $0.00.    Josh Neves, PharmD

## 2025-03-24 ENCOUNTER — PHARMACY VISIT (OUTPATIENT)
Dept: PHARMACY | Facility: CLINIC | Age: OVER 89
End: 2025-03-24
Payer: MEDICARE

## 2025-03-31 ENCOUNTER — PATIENT MESSAGE (OUTPATIENT)
Dept: PRIMARY CARE | Facility: CLINIC | Age: OVER 89
End: 2025-03-31
Payer: MEDICARE

## 2025-03-31 DIAGNOSIS — G47.00 INSOMNIA, UNSPECIFIED TYPE: ICD-10-CM

## 2025-03-31 RX ORDER — TRAZODONE HYDROCHLORIDE 100 MG/1
100 TABLET ORAL NIGHTLY PRN
Qty: 90 TABLET | Refills: 0 | Status: SHIPPED | OUTPATIENT
Start: 2025-03-31 | End: 2026-03-31

## 2025-04-23 ENCOUNTER — TELEPHONE (OUTPATIENT)
Dept: PRIMARY CARE | Facility: CLINIC | Age: OVER 89
End: 2025-04-23
Payer: MEDICARE

## 2025-04-23 NOTE — TELEPHONE ENCOUNTER
Patient is having bathroom issues and is asking if tomorrow can be a virtual follow up appointment? If not she will reschedule for another time

## 2025-04-24 ENCOUNTER — APPOINTMENT (OUTPATIENT)
Dept: PRIMARY CARE | Facility: CLINIC | Age: OVER 89
End: 2025-04-24
Payer: MEDICARE

## 2025-05-11 DIAGNOSIS — K21.9 GASTROESOPHAGEAL REFLUX DISEASE, UNSPECIFIED WHETHER ESOPHAGITIS PRESENT: ICD-10-CM

## 2025-05-13 RX ORDER — PANTOPRAZOLE SODIUM 40 MG/1
40 TABLET, DELAYED RELEASE ORAL DAILY
Qty: 90 TABLET | Refills: 0 | Status: SHIPPED | OUTPATIENT
Start: 2025-05-13

## 2025-05-17 DIAGNOSIS — I48.21 PERMANENT ATRIAL FIBRILLATION (MULTI): ICD-10-CM

## 2025-05-21 RX ORDER — DILTIAZEM HYDROCHLORIDE 120 MG/1
120 TABLET, FILM COATED ORAL DAILY
Qty: 90 TABLET | Refills: 3 | Status: SHIPPED | OUTPATIENT
Start: 2025-05-21

## 2025-05-27 DIAGNOSIS — E87.6 HYPOKALEMIA: ICD-10-CM

## 2025-05-27 DIAGNOSIS — I10 BENIGN ESSENTIAL HYPERTENSION: Primary | ICD-10-CM

## 2025-05-27 RX ORDER — POTASSIUM CHLORIDE ORAL 1.5 G/15ML
10 SOLUTION ORAL DAILY
Qty: 946 ML | Refills: 0 | Status: SHIPPED | OUTPATIENT
Start: 2025-05-27

## 2025-06-21 ENCOUNTER — PHARMACY VISIT (OUTPATIENT)
Dept: PHARMACY | Facility: CLINIC | Age: OVER 89
End: 2025-06-21
Payer: MEDICARE

## 2025-06-21 PROCEDURE — RXMED WILLOW AMBULATORY MEDICATION CHARGE

## 2025-07-01 DIAGNOSIS — G47.00 INSOMNIA, UNSPECIFIED TYPE: ICD-10-CM

## 2025-07-01 RX ORDER — TRAZODONE HYDROCHLORIDE 100 MG/1
100 TABLET ORAL NIGHTLY PRN
Qty: 90 TABLET | Refills: 0 | Status: SHIPPED | OUTPATIENT
Start: 2025-07-01 | End: 2026-07-01

## 2025-08-04 ENCOUNTER — TELEPHONE (OUTPATIENT)
Dept: PRIMARY CARE | Facility: CLINIC | Age: OVER 89
End: 2025-08-04
Payer: MEDICARE

## 2025-08-04 DIAGNOSIS — R05.3 CHRONIC COUGH: ICD-10-CM

## 2025-08-04 RX ORDER — ALBUTEROL SULFATE 90 UG/1
2 INHALANT RESPIRATORY (INHALATION) EVERY 4 HOURS PRN
Qty: 18 G | Refills: 0 | Status: SHIPPED | OUTPATIENT
Start: 2025-08-04

## 2025-08-04 NOTE — TELEPHONE ENCOUNTER
Rx Refill Request Telephone Encounter    Name:  Henny Medina  :  [id number]  Medication Name:      albuterol 90 mcg/actuation inhaler     Specific Pharmacy location:      [place] [id number]  Phone: [phone]  Fax: [phone]     Date of last appointment:  10/22/24  Date of next appointment:  25  Best number to reach patient:  [phone]

## 2025-08-11 DIAGNOSIS — K21.9 GASTROESOPHAGEAL REFLUX DISEASE, UNSPECIFIED WHETHER ESOPHAGITIS PRESENT: ICD-10-CM

## 2025-08-11 RX ORDER — PANTOPRAZOLE SODIUM 40 MG/1
40 TABLET, DELAYED RELEASE ORAL DAILY
Qty: 90 TABLET | Refills: 0 | Status: SHIPPED | OUTPATIENT
Start: 2025-08-11

## 2025-08-12 DIAGNOSIS — I10 BENIGN ESSENTIAL HYPERTENSION: ICD-10-CM

## 2025-08-12 RX ORDER — CARVEDILOL 3.12 MG/1
3.12 TABLET ORAL
Qty: 180 TABLET | Refills: 3 | Status: SHIPPED | OUTPATIENT
Start: 2025-08-12 | End: 2026-08-12

## 2025-08-29 DIAGNOSIS — R05.3 CHRONIC COUGH: ICD-10-CM

## 2025-08-29 RX ORDER — ALBUTEROL SULFATE 90 UG/1
INHALANT RESPIRATORY (INHALATION)
Qty: 18 G | Refills: 1 | Status: SHIPPED | OUTPATIENT
Start: 2025-08-29

## 2025-09-02 ENCOUNTER — APPOINTMENT (OUTPATIENT)
Dept: PRIMARY CARE | Facility: CLINIC | Age: OVER 89
End: 2025-09-02
Payer: MEDICARE

## 2025-09-15 ENCOUNTER — APPOINTMENT (OUTPATIENT)
Dept: PHARMACY | Facility: HOSPITAL | Age: OVER 89
End: 2025-09-15
Payer: MEDICARE

## 2025-11-04 ENCOUNTER — APPOINTMENT (OUTPATIENT)
Dept: PRIMARY CARE | Facility: CLINIC | Age: OVER 89
End: 2025-11-04
Payer: MEDICARE